# Patient Record
Sex: MALE | Race: WHITE | NOT HISPANIC OR LATINO | Employment: OTHER | URBAN - METROPOLITAN AREA
[De-identification: names, ages, dates, MRNs, and addresses within clinical notes are randomized per-mention and may not be internally consistent; named-entity substitution may affect disease eponyms.]

---

## 2023-09-07 ENCOUNTER — OFFICE VISIT (OUTPATIENT)
Dept: CARDIOLOGY | Facility: CLINIC | Age: 68
End: 2023-09-07
Payer: COMMERCIAL

## 2023-09-07 VITALS
SYSTOLIC BLOOD PRESSURE: 130 MMHG | HEART RATE: 69 BPM | OXYGEN SATURATION: 98 % | DIASTOLIC BLOOD PRESSURE: 60 MMHG | HEIGHT: 68 IN | BODY MASS INDEX: 30.16 KG/M2 | WEIGHT: 199 LBS

## 2023-09-07 DIAGNOSIS — I10 HYPERTENSION, ESSENTIAL: ICD-10-CM

## 2023-09-07 DIAGNOSIS — E78.2 MIXED HYPERLIPIDEMIA: ICD-10-CM

## 2023-09-07 DIAGNOSIS — E11.65 TYPE 2 DIABETES MELLITUS WITH HYPERGLYCEMIA, WITHOUT LONG-TERM CURRENT USE OF INSULIN: Primary | ICD-10-CM

## 2023-09-07 PROBLEM — I25.118 CORONARY ARTERY DISEASE OF NATIVE ARTERY OF NATIVE HEART WITH STABLE ANGINA PECTORIS: Status: ACTIVE | Noted: 2023-09-07

## 2023-09-07 PROCEDURE — 93000 ELECTROCARDIOGRAM COMPLETE: CPT | Performed by: INTERNAL MEDICINE

## 2023-09-07 PROCEDURE — 99204 OFFICE O/P NEW MOD 45 MIN: CPT | Performed by: INTERNAL MEDICINE

## 2023-09-07 RX ORDER — OMEPRAZOLE 40 MG/1
CAPSULE, DELAYED RELEASE ORAL DAILY
COMMUNITY

## 2023-09-07 RX ORDER — BLOOD SUGAR DIAGNOSTIC
STRIP MISCELLANEOUS AS NEEDED
COMMUNITY
Start: 2023-08-23

## 2023-09-07 RX ORDER — LISINOPRIL 10 MG/1
1 TABLET ORAL DAILY
COMMUNITY

## 2023-09-07 RX ORDER — ASPIRIN 81 MG/1
TABLET, CHEWABLE ORAL DAILY
COMMUNITY

## 2023-09-07 RX ORDER — LEVOTHYROXINE SODIUM 0.12 MG/1
TABLET ORAL DAILY
COMMUNITY

## 2023-09-07 RX ORDER — TESTOSTERONE CYPIONATE 200 MG/ML
VIAL (ML) INTRAMUSCULAR WEEKLY
COMMUNITY

## 2023-09-07 RX ORDER — TRAZODONE HYDROCHLORIDE 150 MG/1
TABLET ORAL NIGHTLY
COMMUNITY
Start: 2023-09-05

## 2023-09-07 RX ORDER — CLOPIDOGREL BISULFATE 75 MG/1
TABLET ORAL DAILY
COMMUNITY
End: 2023-09-07

## 2023-09-07 RX ORDER — METOPROLOL SUCCINATE 25 MG/1
1 TABLET, EXTENDED RELEASE ORAL DAILY
COMMUNITY
End: 2023-09-07

## 2023-09-07 RX ORDER — TADALAFIL 5 MG/1
TABLET ORAL AS NEEDED
COMMUNITY

## 2023-09-07 RX ORDER — EMPAGLIFLOZIN 25 MG/1
1 TABLET, FILM COATED ORAL DAILY
COMMUNITY

## 2023-09-07 RX ORDER — ATORVASTATIN CALCIUM 80 MG/1
TABLET, FILM COATED ORAL DAILY
COMMUNITY

## 2023-09-07 RX ORDER — INSULIN HUMAN 500 [IU]/ML
INJECTION, SOLUTION SUBCUTANEOUS 2 TIMES WEEKLY
COMMUNITY

## 2023-09-07 NOTE — PROGRESS NOTES
OFFICE VISIT  NOTE  [facility] CARDIOLOGY      Name: Martin Benson    Date: 2023  MRN:  [id number]  :  1955      REFERRING/PRIMARY PROVIDER:  Provider, No Known    Chief Complaint   Patient presents with    Establish Care    HX of CAD       HPI: Martin Benson is a 67 y.o. male who presents today for CAD.  History of 3V CABG in  in [place], LIMA-LAD, SVG-OM1, and SVG-RPDA.  History of diabetes mellitus type 2, hypertension, hyperlipidemia.  He recently moved to [place] to be closer to family his sister is Niharika Mcdonald.  He reports not taking all of his medicines as prescribed the last 3 months due to moving and he has felt better than he has in years.  A1c uncontrolled at 10.1, cholesterol up to 200.  Exercises and when he is physically active denies chest pain.  Previous chest pain was elephant on his chest and shortness of breath.    Past Medical History:   Diagnosis Date    Acid reflux     Chicken pox     Heart attack     Hepatitis A     Hypertension     Measles     Mumps     Type 2 diabetes mellitus        Past Surgical History:   Procedure Laterality Date    ARTERIAL BYPASS SURGERY      BACK SURGERY      CAROTID STENT      ROTATOR CUFF REPAIR         Social History     Socioeconomic History    Marital status:    Tobacco Use    Smoking status: Former     Types: Cigarettes     Quit date:      Years since quittin.6    Smokeless tobacco: Never   Vaping Use    Vaping Use: Never used   Substance and Sexual Activity    Alcohol use: Yes     Comment: socially maybe 4 drinks a month    Drug use: Never    Sexual activity: Defer       Family History   Problem Relation Age of Onset    No Known Problems Mother     Cancer Father     Diabetes Father     Other Sister         Heart problems        ROS:   Constitutional no fever,  no weight loss   Skin no rash, no subcutaneous nodules   Otolaryngeal no difficulty swallowing   Cardiovascular See HPI   Pulmonary no cough, no sputum  "production   Gastrointestinal no constipation, no diarrhea   Genitourinary no dysuria, no hematuria   Hematologic no easy bruisability, no abnormal bleeding   Musculoskeletal no muscle pain   Neurologic no dizziness, no falls         Allergies   Allergen Reactions    Pregabalin Swelling         Current Outpatient Medications:     aspirin 81 MG chewable tablet, Daily., Disp: , Rfl:     atorvastatin (LIPITOR) 80 MG tablet, Daily., Disp: , Rfl:     clopidogrel (PLAVIX) 75 MG tablet, Daily., Disp: , Rfl:     Continuous Blood Gluc Sensor (FreeStyle Kian 2 Sensor) misc, Daily., Disp: , Rfl:     HumuLIN R U-500 KwikPen 500 UNIT/ML solution pen-injector CONCENTRATED injection, 2 (Two) Times a Week., Disp: , Rfl:     Jardiance 25 MG tablet tablet, Take 1 tablet by mouth Daily., Disp: , Rfl:     levothyroxine (SYNTHROID, LEVOTHROID) 125 MCG tablet, Daily., Disp: , Rfl:     lisinopril (PRINIVIL,ZESTRIL) 10 MG tablet, Take 1 tablet by mouth Daily., Disp: , Rfl:     metoprolol succinate XL (TOPROL-XL) 25 MG 24 hr tablet, Take 1 tablet by mouth Daily., Disp: , Rfl:     omeprazole (priLOSEC) 40 MG capsule, Daily., Disp: , Rfl:     OneTouch Verio test strip, As Needed., Disp: , Rfl:     tadalafil (CIALIS) 5 MG tablet, As Needed., Disp: , Rfl:     Testosterone Cypionate 200 MG/ML solution, 1 (One) Time Per Week., Disp: , Rfl:     traZODone (DESYREL) 150 MG tablet, Every Night. Starting tonight 09/07/2023, Disp: , Rfl:     Vitals:    09/07/23 1053   BP: 130/60   BP Location: Right arm   Patient Position: Sitting   Cuff Size: Adult   Pulse: 69   SpO2: 98%   Weight: 90.3 kg (199 lb)   Height: 172.7 cm (68\")     Body mass index is 30.26 kg/m².    PHYSICAL EXAM:    General Appearance:   well developed  well nourished  HENT:   oropharynx moist  lips not cyanotic  Neck:  thyroid not enlarged  supple  Respiratory:  no respiratory distress  normal breath sounds  no rales  Cardiovascular:  no jugular venous distention  regular rhythm  apical " impulse normal  S1 normal, S2 normal  no S3, no S4   no murmur  no rub, no thrill  carotid pulses normal; no bruit  lower extremity edema: none      Musculoskeletal:  no clubbing of fingers.   normocephalic, head atraumatic  Skin:   warm, dry  Psychiatric:  judgement and insight appropriate  normal mood and affect    RESULTS:     ECG 12 Lead    Date/Time: 9/7/2023 11:15 AM  Performed by: John Sam MD  Authorized by: John Sam MD   Comparison: not compared with previous ECG   Previous ECG: no previous ECG available  Rhythm: sinus rhythm  Rate: normal  BPM: 69  QRS axis: normal    Clinical impression: abnormal EKG  Comments: Lateral T wave inversions noted.  Consistent with previous              Labs:  No results found for: CHOL, TRIG, HDL, LDL, AST, ALT  No results found for: HGBA1C  No components found for: CREATINININE  No results found for: EGFRIFNONA    Most recent PCP note, imaging tests, and labs reviewed.    ASSESSMENT:  Problem List Items Addressed This Visit    None  Visit Diagnoses       Type 2 diabetes mellitus with hyperglycemia, without long-term current use of insulin    -  Primary    Relevant Medications    Jardiance 25 MG tablet tablet    HumuLIN R U-500 KwikPen 500 UNIT/ML solution pen-injector CONCENTRATED injection    Hypertension, essential        Relevant Medications    lisinopril (PRINIVIL,ZESTRIL) 10 MG tablet    metoprolol succinate XL (TOPROL-XL) 25 MG 24 hr tablet    Mixed hyperlipidemia        Relevant Medications    atorvastatin (LIPITOR) 80 MG tablet            PLAN:    1.  CAD:  Continue aspirin, okay to discontinue Plavix  Continue atorvastatin  Continue Jardiance  Increase aerobic exercise    2.  Primary hypertension:  Well-controlled on current regimen okay to discontinue metoprolol due to fatigue and has been over 2 years since CABG  Continue lisinopril    3.  Mixed hyperlipidemia:  Discussed plan tropic benefits of statins, he was not taking 1 because he felt his  cholesterol numbers were good even prior to CAD  Refilled atorvastatin 80 mg daily, advised him to take daily.    4.  Diabetes mellitus type 2:  Uncontrolled A1c, has upcoming appointment with [facility] endocrinology, continue Jardiance, consider GLP-1 agonist  Discussed importance of diabetes control for prevention of further atherosclerotic heart disease      Advance Care Planning   ACP discussion was held with the patient during this visit. Patient has an advance directive (not in EMR), copy requested.         Return to clinic in 9 months, or sooner as needed.    Thank you for the opportunity to share in the care of your patient; please do not hesitate to call me with any questions.     John Sam MD, [facility], [facility] [place] [facility]  Office: [phone] [address] [place] [address]    09/07/23

## 2024-03-19 ENCOUNTER — TELEPHONE (OUTPATIENT)
Dept: CARDIOLOGY | Facility: CLINIC | Age: 69
End: 2024-03-19
Payer: COMMERCIAL

## 2024-03-19 NOTE — TELEPHONE ENCOUNTER
Renetta at [facility] request cardiac risk assessment for umbilical hernia repair with mesh.     Last seen 9/7/23- CAD 3V CABG in 2021 in [place], LIMA-LAD, SVG-OM1, and SVG-RPDA, currently taking Aspirin 81 mg.     If okay to proceed will fax CC letter to [phone]

## 2024-06-05 ENCOUNTER — TELEPHONE (OUTPATIENT)
Dept: CARDIOLOGY | Facility: HOSPITAL | Age: 69
End: 2024-06-05
Payer: COMMERCIAL

## 2024-06-05 NOTE — TELEPHONE ENCOUNTER
Spoke with patient and he stated he had labwork done through [facility] with his endocrinologist at [place]. He stated he will bring lab results with him  at his f/u appointment.

## 2024-06-13 ENCOUNTER — OFFICE VISIT (OUTPATIENT)
Dept: CARDIOLOGY | Facility: CLINIC | Age: 69
End: 2024-06-13
Payer: MEDICARE

## 2024-06-13 VITALS
BODY MASS INDEX: 29.46 KG/M2 | OXYGEN SATURATION: 94 % | SYSTOLIC BLOOD PRESSURE: 98 MMHG | DIASTOLIC BLOOD PRESSURE: 74 MMHG | HEIGHT: 68 IN | HEART RATE: 102 BPM | WEIGHT: 194.4 LBS

## 2024-06-13 DIAGNOSIS — Z76.89 ENCOUNTER TO ESTABLISH CARE: ICD-10-CM

## 2024-06-13 DIAGNOSIS — E78.2 MIXED HYPERLIPIDEMIA: ICD-10-CM

## 2024-06-13 DIAGNOSIS — I25.118 CORONARY ARTERY DISEASE OF NATIVE ARTERY OF NATIVE HEART WITH STABLE ANGINA PECTORIS: Primary | ICD-10-CM

## 2024-06-13 DIAGNOSIS — I10 HYPERTENSION, ESSENTIAL: ICD-10-CM

## 2024-06-13 DIAGNOSIS — R35.0 BENIGN PROSTATIC HYPERPLASIA WITH URINARY FREQUENCY: Primary | ICD-10-CM

## 2024-06-13 DIAGNOSIS — R35.0 BENIGN PROSTATIC HYPERPLASIA WITH URINARY FREQUENCY: ICD-10-CM

## 2024-06-13 DIAGNOSIS — E11.69 TYPE 2 DIABETES MELLITUS WITH OTHER SPECIFIED COMPLICATION, WITH LONG-TERM CURRENT USE OF INSULIN: ICD-10-CM

## 2024-06-13 DIAGNOSIS — R07.2 PRECORDIAL PAIN: ICD-10-CM

## 2024-06-13 DIAGNOSIS — N40.1 BENIGN PROSTATIC HYPERPLASIA WITH URINARY FREQUENCY: ICD-10-CM

## 2024-06-13 DIAGNOSIS — Z79.4 TYPE 2 DIABETES MELLITUS WITH OTHER SPECIFIED COMPLICATION, WITH LONG-TERM CURRENT USE OF INSULIN: ICD-10-CM

## 2024-06-13 DIAGNOSIS — N40.1 BENIGN PROSTATIC HYPERPLASIA WITH URINARY FREQUENCY: Primary | ICD-10-CM

## 2024-06-13 RX ORDER — TRIAMCINOLONE ACETONIDE 1 MG/G
CREAM TOPICAL 2 TIMES DAILY
COMMUNITY
Start: 2024-03-06

## 2024-06-13 RX ORDER — NYSTATIN 100000 U/G
CREAM TOPICAL AS NEEDED
COMMUNITY
Start: 2024-03-06

## 2024-06-13 NOTE — PROGRESS NOTES
OFFICE VISIT  NOTE  [facility] CARDIOLOGY      Name: Demarcus Benson    Date: 2024  MRN:  [id number]  :  1955      REFERRING/PRIMARY PROVIDER:  Provider, No Known    Chief Complaint   Patient presents with    Coronary Artery Disease    Hypertension    Hyperlipidemia    Shortness of Breath    Fatigue       HPI: Demarcus Benson is a 68 y.o. male who presents for CAD.  History of 3V CABG in  in [place], LIMA-LAD, SVG-OM1, and SVG-RPDA.  History of diabetes mellitus type 2, hypertension, hyperlipidemia.  He recently moved to [place] to be closer to family his sister is Niharika Mcdonald.  He reports not taking all of his medicines as prescribed the last 3 months due to moving and he has felt better than he has in years.  A1c uncontrolled at 10.1, cholesterol up to 200.  Exercises and when he is physically active denies chest pain.  Previous chest pain was elephant on his chest and shortness of breath.  Had COVID in January now has worsening shortness of breath and some chest discomfort Jenny when he is exerting himself.    Past Medical History:   Diagnosis Date    Acid reflux     Chicken pox     Heart attack     Hepatitis A     Hypertension     Measles     Mumps     Type 2 diabetes mellitus        Past Surgical History:   Procedure Laterality Date    BACK SURGERY      CAROTID STENT      OTHER SURGICAL HISTORY      Tripple Bypass    ROTATOR CUFF REPAIR         Social History     Socioeconomic History    Marital status:    Tobacco Use    Smoking status: Former     Current packs/day: 0.00     Types: Cigarettes     Quit date:      Years since quittin.4     Passive exposure: Past    Smokeless tobacco: Never   Vaping Use    Vaping status: Never Used   Substance and Sexual Activity    Alcohol use: Yes     Comment: socially maybe 4 drinks a month    Drug use: Never    Sexual activity: Defer       Family History   Problem Relation Age of Onset    No Known Problems Mother      "Cancer Father     Diabetes Father     Other Sister         Heart problems        ROS:   Constitutional no fever,  no weight loss   Skin no rash, no subcutaneous nodules   Otolaryngeal no difficulty swallowing   Cardiovascular See HPI   Pulmonary no cough, no sputum production   Gastrointestinal no constipation, no diarrhea   Genitourinary no dysuria, no hematuria   Hematologic no easy bruisability, no abnormal bleeding   Musculoskeletal no muscle pain   Neurologic no dizziness, no falls         Allergies   Allergen Reactions    Pregabalin Swelling         Current Outpatient Medications:     aspirin 81 MG chewable tablet, Daily., Disp: , Rfl:     atorvastatin (LIPITOR) 80 MG tablet, Daily., Disp: , Rfl:     Continuous Blood Gluc Sensor (FreeStyle Kian 2 Sensor) misc, Daily., Disp: , Rfl:     HumuLIN R U-500 KwikPen 500 UNIT/ML solution pen-injector CONCENTRATED injection, 2 (Two) Times a Day. Sliding scale, Disp: , Rfl:     Jardiance 25 MG tablet tablet, Take 1 tablet by mouth Daily., Disp: , Rfl:     levothyroxine (SYNTHROID, LEVOTHROID) 125 MCG tablet, Daily., Disp: , Rfl:     lisinopril (PRINIVIL,ZESTRIL) 10 MG tablet, Take 1 tablet by mouth Daily., Disp: , Rfl:     nystatin (MYCOSTATIN) 008370 UNIT/GM cream, Apply  topically to the appropriate area as directed As Needed., Disp: , Rfl:     omeprazole (priLOSEC) 40 MG capsule, Daily., Disp: , Rfl:     OneTouch Verio test strip, As Needed., Disp: , Rfl:     tadalafil (CIALIS) 5 MG tablet, As Needed., Disp: , Rfl:     triamcinolone (KENALOG) 0.1 % cream, Apply  topically to the appropriate area as directed 2 (Two) Times a Day., Disp: , Rfl:     Vitals:    06/13/24 1327   BP: 98/74   BP Location: Left arm   Patient Position: Sitting   Pulse: 102   SpO2: 94%   Weight: 88.2 kg (194 lb 6.4 oz)   Height: 172.7 cm (68\")       Body mass index is 29.56 kg/m².    PHYSICAL EXAM:    General Appearance:   well developed  well nourished  HENT:   oropharynx moist  lips not " "cyanotic  Neck:  thyroid not enlarged  supple  Respiratory:  no respiratory distress  normal breath sounds  no rales  Cardiovascular:  no jugular venous distention  regular rhythm  apical impulse normal  S1 normal, S2 normal  no S3, no S4   no murmur  no rub, no thrill  carotid pulses normal; no bruit  lower extremity edema: none      Musculoskeletal:  no clubbing of fingers.   normocephalic, head atraumatic  Skin:   warm, dry  Psychiatric:  judgement and insight appropriate  normal mood and affect    RESULTS:   Procedures          Labs:  No results found for: \"CHOL\", \"TRIG\", \"HDL\", \"LDL\", \"AST\", \"ALT\"  No results found for: \"HGBA1C\"  No components found for: \"CREATINININE\"  No results found for: \"EGFRIFNONA\"    Most recent PCP note, imaging tests, and labs reviewed.    ASSESSMENT:  Problem List Items Addressed This Visit       Coronary artery disease of native artery of native heart with stable angina pectoris - Primary    Overview     3V CABG 11/19/2021 (LIMA-LAD, SVG-OM1, SVG-RPDA in [place].  Previous history of stent to mid LAD in 2004 and 2012.         Relevant Orders    Adult Transthoracic Echo Complete w/ Color, Spectral and Contrast if necessary per protocol    Stress Test With Myocardial Perfusion One Day    Hypertension, essential    Mixed hyperlipidemia    Type 2 diabetes mellitus    Benign prostatic hyperplasia with urinary frequency     Other Visit Diagnoses       Precordial pain        Relevant Orders    Adult Transthoracic Echo Complete w/ Color, Spectral and Contrast if necessary per protocol    Stress Test With Myocardial Perfusion One Day            PLAN:    1.  CAD:  Continue aspirin, stopped Plavix 9/2023  Continue atorvastatin  Continue Jardiance    Worsening chest pain shortness of breath recently, stress test and echocardiogram ordered for further assessment.    2.  Primary hypertension:  Stop metoprolol due to fatigue 9/2023  Continue lisinopril    3.  Mixed hyperlipidemia:  Refilled " atorvastatin 80 mg daily, advised him to take daily.  Lipids 5/2024 TC: 174, LDL 98, HDL 36    Decrease saturated fat in his diet will repeat in 6 months    4.  Diabetes mellitus type 2:  Uncontrolled A1c continue Jardiance, consider GLP-1 agonist  Discussed importance of diabetes control for prevention of further atherosclerotic heart disease  Refer to endocrinology    Needs primary care physician    5.  BPH, requesting urology consultation    Advance Care Planning   ACP discussion was held with the patient during this visit. Patient has an advance directive (not in EMR), copy requested.         Return to clinic in 12 months, or sooner as needed.    Thank you for the opportunity to share in the care of your patient; please do not hesitate to call me with any questions.     John Sam MD, [facility], [facility]  Office: [phone] [address] [place] [address]    06/13/24

## 2024-07-02 ENCOUNTER — OFFICE VISIT (OUTPATIENT)
Dept: UROLOGY | Facility: CLINIC | Age: 69
End: 2024-07-02
Payer: MEDICARE

## 2024-07-02 DIAGNOSIS — N40.1 BENIGN PROSTATIC HYPERPLASIA WITH URINARY FREQUENCY: Primary | ICD-10-CM

## 2024-07-02 DIAGNOSIS — N32.81 OAB (OVERACTIVE BLADDER): ICD-10-CM

## 2024-07-02 DIAGNOSIS — R35.0 BENIGN PROSTATIC HYPERPLASIA WITH URINARY FREQUENCY: Primary | ICD-10-CM

## 2024-07-02 DIAGNOSIS — R39.9 LOWER URINARY TRACT SYMPTOMS (LUTS): ICD-10-CM

## 2024-07-02 NOTE — PROGRESS NOTES
LUTS Male Office Visit      Patient Name: Demarcus Benson  : 1955   MRN: [id number]     Chief Complaint: Lower Urinary Tract Symptoms    Referring Provider: John Sam MD    History of Present Illness: Mr. Benson is a 68 y.o. male with history of lower urinary tract symptoms.  Past medical history significant for CAD, hypertension, hyperlipidemia, type 2 diabetes.  Patient presents today for evaluation of lower urinary tract symptoms.  He reports bother associate with storage and voiding symptoms.  Reports primary bother symptoms include urinary frequency, urgency.  Patient denies history of hematuria.  Denies history of recurrent urinary tract infection.  Denies past instrumentation or procedure.  Currently taking 5 mg tadalafil.  Reports progression, worsening of symptoms over the past 1 to 2 years.  IPSS 23.          Subjective      Review of System: Review of Systems   Genitourinary:  Positive for frequency and urgency. Negative for decreased urine volume, difficulty urinating, dysuria, enuresis, flank pain and hematuria.        I have reviewed the ROS documented by my clinical staff, updated appropriate and I agree. Bunny Pritchett MD    Past Medical History:  Past Medical History:   Diagnosis Date    Acid reflux     Chicken pox     Clotting disorder 2004    started when prescribed aspirin and plavix    Coronary artery disease 2004    Heart attack     Hepatitis A     Hypertension     Measles     Mumps     Type 2 diabetes mellitus        Past Surgical History:  Past Surgical History:   Procedure Laterality Date    BACK SURGERY      CAROTID STENT      HERNIA REPAIR  3/2024    OTHER SURGICAL HISTORY      Tripple Bypass    ROTATOR CUFF REPAIR      VASECTOMY         Medications:    Current Outpatient Medications:     aspirin 81 MG chewable tablet, Daily., Disp: , Rfl:     atorvastatin (LIPITOR) 80 MG tablet, Daily., Disp: , Rfl:     Continuous Blood Gluc Sensor (FreeStyle Kian 2  Sensor) misc, Daily., Disp: , Rfl:     HumuLIN R U-500 KwikPen 500 UNIT/ML solution pen-injector CONCENTRATED injection, 2 (Two) Times a Day. Sliding scale, Disp: , Rfl:     Jardiance 25 MG tablet tablet, Take 1 tablet by mouth Daily., Disp: , Rfl:     levothyroxine (SYNTHROID, LEVOTHROID) 125 MCG tablet, Daily., Disp: , Rfl:     lisinopril (PRINIVIL,ZESTRIL) 10 MG tablet, Take 1 tablet by mouth Daily., Disp: , Rfl:     metFORMIN (GLUCOPHAGE) 1000 MG tablet, Take 1 tablet by mouth 2 (Two) Times a Day., Disp: , Rfl:     nystatin (MYCOSTATIN) [id number] UNIT/GM cream, Apply  topically to the appropriate area as directed As Needed., Disp: , Rfl:     omeprazole (priLOSEC) 40 MG capsule, Daily., Disp: , Rfl:     OneTouch Verio test strip, As Needed., Disp: , Rfl:     tadalafil (CIALIS) 5 MG tablet, As Needed., Disp: , Rfl:     triamcinolone (KENALOG) 0.1 % cream, Apply  topically to the appropriate area as directed 2 (Two) Times a Day., Disp: , Rfl:     Mirabegron ER (Myrbetriq) 25 MG tablet sustained-release 24 hour 24 hr tablet, Take 1 tablet by mouth Daily., Disp: 30 tablet, Rfl: 0    Allergies:  Allergies   Allergen Reactions    Pregabalin Swelling       Social History:  Social History     Socioeconomic History    Marital status:    Tobacco Use    Smoking status: Former     Current packs/day: 0.00     Average packs/day: 0.8 packs/day for 28.3 years (21.2 ttl pk-yrs)     Types: Cigarettes     Start date: 1975     Quit date: 2004     Years since quittin.2     Passive exposure: Past    Smokeless tobacco: Never    Tobacco comments:     A few prior brief periods of stopping   Vaping Use    Vaping status: Never Used   Substance and Sexual Activity    Alcohol use: Yes     Alcohol/week: 3.0 standard drinks of alcohol     Types: 1 Glasses of wine, 1 Cans of beer, 1 Shots of liquor per week     Comment: Social drinker only,  4 drinks a month is more accurate    Drug use: Never    Sexual activity: Not Currently      Partners: Female     Birth control/protection: Vasectomy       Family History:  Family History   Problem Relation Age of Onset    No Known Problems Mother     Cancer Father         colon cancer    Diabetes Father     Other Sister         Heart problems       IPSS Questionnaire (AUA-7):  Over the past month…    1)  Incomplete Emptying  How often have you had a sensation of not emptying your bladder?  4 - More than half the time   2)  Frequency  How often have you had to urinate less than every two hours? 5 - Almost always   3)  Intermittency  How often have you found you stopped and started again several times when you urinated?  3 - About half the time   4) Urgency  How often have you found it difficult to postpone urination?  5 - Almost always   5) Weak Stream  How often have you had a weak urinary stream?  3 - About half the time   6) Straining  How often have you had to push or strain to begin urination?  0 - Not at all   7) Nocturia  How many times did you typically get up at night to urinate?  3 - 3 times   Total Score:  23       Quality of life due to urinary symptoms:  If you were to spend the rest of your life with your urinary condition the way it is now, how would you feel about that? 6-Terrible   Urine Leakage (Incontinence) 1-Mild (A few drops a day, no pad use)           Objective     Physical Exam:     Vital Signs: There were no vitals filed for this visit.  There is no height or weight on file to calculate BMI.     Physical Exam  Vitals and nursing note reviewed.   Constitutional:       Appearance: Normal appearance.   HENT:      Head: Normocephalic and atraumatic.   Cardiovascular:      Comments: Well perfused  Pulmonary:      Effort: Pulmonary effort is normal.   Abdominal:      General: Abdomen is flat.      Palpations: Abdomen is soft.   Musculoskeletal:         General: Normal range of motion.   Skin:     General: Skin is warm and dry.   Neurological:      General: No focal deficit present.  "     Mental Status: He is alert and oriented to person, place, and time. Mental status is at baseline.   Psychiatric:         Mood and Affect: Mood normal.         Behavior: Behavior normal.         Thought Content: Thought content normal.         Judgment: Judgment normal.             Labs:   No results found for: \"PSA\"    Brief Urine Lab Results       None                 No results found for: \"GLUCOSE\", \"CALCIUM\", \"NA\", \"K\", \"CO2\", \"CL\", \"BUN\", \"CREATININE\", \"EGFRIFAFRI\", \"EGFRIFNONA\", \"BCR\", \"ANIONGAP\"    No results found for: \"WBC\", \"HGB\", \"HCT\", \"MCV\", \"PLT\"    Images:   No Images in the past 120 days found..    Measures:   Tobacco:   Demarcus Benson  reports that he quit smoking about 20 years ago. His smoking use included cigarettes. He started smoking about 48 years ago. He has a 21.2 pack-year smoking history. He has been exposed to tobacco smoke. He has never used smokeless tobacco. I have educated him on the risk of diseases from using tobacco products.     Assessment / Plan      Assessment:  Mr. Benson is a 68 y.o. male who presents with lower urinary tract symptoms. He reports bother associated with both storage and voiding urinary symptoms.. IPSS today is 23.     Diagnoses and all orders for this visit:    1. Benign prostatic hyperplasia with urinary frequency (Primary)    2. Lower urinary tract symptoms (LUTS)    3. OAB (overactive bladder)  -     Mirabegron ER (Myrbetriq) 25 MG tablet sustained-release 24 hour 24 hr tablet; Take 1 tablet by mouth Daily.  Dispense: 30 tablet; Refill: 0           Patient Education:     Today we discussed the etiology and management of lower urinary tract symptoms.  We discussed work-up including history and physical exam, symptom questionnaire, PVR. We discussed benign growth of the prostate as men age.  We discussed this occurs mostly in the transition zone of the prostate.  We discussed there is both an increase the in the amount of prostatic stroma as well as " the number of alpha-1 receptors in the prostate.  We discussed that as the prostate grows there can be increased bladder outlet resistance.  We discussed this results in increased smooth muscle muscle tone which can cause long-term changes to the bladder.  We discussed the presentation and symptoms including decreased force of stream, hesitancy, intermittent stream, incomplete emptying, frequency, urgency, incontinence, nocturia. We discussed that his lower urinary tract symptoms are both storage and voiding symptoms.  We discussed that if the symptoms are prolonged the bladder may be decompensated resulting in absent or ineffective contractions.  We discussed that the severity of symptoms do not always correlate with the exact prostate size.  Discussed that the severity of urinary symptoms do not correlate with the degree of bladder outlet obstruction.  We discussed the indication for operative intervention includes hematuria, recurrent urinary tract infection, damage to the kidney and change in renal function, acute urinary retention.  We discussed that the patient does not have any of these for indications symptom bother may direct desire for operative intervention.    We discussed management strategies including conservative measures to improve symptoms.  We discussed the importance of decreasing caffeinated and irritative beverages, increasing fluid hydration.  We discussed the role that obstipation can play in urinary symptoms and the importance of a bowel regimen.  We discussed medical management and medical options to improve urinary symptoms.  We discussed alpha-blocker, risks and benefits of this medication.  We discussed 5 alpha reductase inhibitor, risks and benefits of this medication.     We discussed cystoscopy, transrectal ultrasound of the prostate for volume.  We discussed that cystoscopy will allow evaluation of the urethra, prostatic architecture/anatomy, health of the bladder.  We discussed  transrectal ultrasound of the prostate for volume will allow us to measure the exact size of the prostate.  We discussed the importance of these work-ups in identifying and tailoring a specific treatment strategy to the patient.     We discussed that based upon the work-up described above we will further decide an appropriate treatment plan.  We discussed that these procedures will help us identify whether p.o. medical management versus procedure for bladder outlet obstruction is warranted.    Patient continues to report primary bother with storage based urinary symptoms.  He is understanding of indication for workup, elects to decline today.  He would like to trial OAB medication, beta 3 agonist.  Discussed risk and benefits of medication.  We will start beta 3 agonist today.  He will follow-up in 4 to 6 weeks for symptom check    Follow Up:   Return in about 6 weeks (around 8/13/2024) for Recheck.    I spent approximately 45 minutes providing clinical care for this patient; including review of patient's chart and provider documentation, face to face time spent with patient in examination room (obtaining history, performing physical exam, discussing diagnosis and management options), placing orders, and completing patient documentation.     Bunny Pritchett MD  [facility]

## 2024-07-04 RX ORDER — MIRABEGRON 25 MG/1
25 TABLET, FILM COATED, EXTENDED RELEASE ORAL DAILY
Qty: 30 TABLET | Refills: 0 | COMMUNITY
Start: 2024-07-04

## 2024-07-17 ENCOUNTER — HOSPITAL ENCOUNTER (OUTPATIENT)
Dept: CARDIOLOGY | Facility: HOSPITAL | Age: 69
Discharge: HOME | End: 2024-07-17
Payer: MEDICARE

## 2024-07-17 VITALS
DIASTOLIC BLOOD PRESSURE: 84 MMHG | OXYGEN SATURATION: 96 % | WEIGHT: 194 LBS | HEIGHT: 68 IN | SYSTOLIC BLOOD PRESSURE: 150 MMHG | BODY MASS INDEX: 29.4 KG/M2 | HEART RATE: 76 BPM

## 2024-07-17 DIAGNOSIS — R07.2 PRECORDIAL PAIN: ICD-10-CM

## 2024-07-17 DIAGNOSIS — I25.118 CORONARY ARTERY DISEASE OF NATIVE ARTERY OF NATIVE HEART WITH STABLE ANGINA PECTORIS: ICD-10-CM

## 2024-07-17 LAB
BH CV ECHO MEAS - AO MAX PG: 5.1 MMHG
BH CV ECHO MEAS - AO ROOT DIAM: 2.8 CM
BH CV ECHO MEAS - AO V2 MAX: 113.2 CM/SEC
BH CV ECHO MEAS - ESV(CUBED): 12.5 ML
BH CV ECHO MEAS - IVSD: 1.21 CM
BH CV ECHO MEAS - LA DIMENSION: 2.3 CM
BH CV ECHO MEAS - LV MAX PG: 5.2 MMHG
BH CV ECHO MEAS - LV MEAN PG: 2.1 MMHG
BH CV ECHO MEAS - LV V1 MAX: 114.1 CM/SEC
BH CV ECHO MEAS - LV V1 VTI: 19.5 CM
BH CV ECHO MEAS - LVIDD: 3.6 CM
BH CV ECHO MEAS - LVIDS: 2.32 CM
BH CV ECHO MEAS - LVOT DIAM: 2 CM
BH CV ECHO MEAS - LVPWD: 1.2 CM
BH CV ECHO MEAS - MV A MAX VEL: 62.4 CM/SEC
BH CV ECHO MEAS - MV DEC SLOPE: 281.2 CM/SEC2
BH CV ECHO MEAS - MV DEC TIME: 426 SEC
BH CV ECHO MEAS - MV MAX PG: 3.7 MMHG
BH CV ECHO MEAS - MV MEAN PG: 1.2 MMHG
BH CV ECHO MEAS - MV P1/2T: 74 MSEC
BH CV ECHO MEAS - MV V2 VTI: 18.2 CM
BH CV ECHO MEAS - PA ACC TIME: 0.07 SEC
BH CV ECHO MEAS - TAPSE (>1.6): 2.03 CM
BH CV REST NUCLEAR ISOTOPE DOSE: 9.9 MCI
BH CV STRESS BP STAGE 2: NORMAL
BH CV STRESS BP STAGE 4: NORMAL
BH CV STRESS COMMENTS STAGE 1: NORMAL
BH CV STRESS DOSE REGADENOSON STAGE 1: 0.4
BH CV STRESS DURATION MIN STAGE 1: 1
BH CV STRESS DURATION MIN STAGE 2: 1
BH CV STRESS DURATION MIN STAGE 3: 1
BH CV STRESS DURATION MIN STAGE 4: 1
BH CV STRESS DURATION SEC STAGE 1: 10
BH CV STRESS DURATION SEC STAGE 2: 0
BH CV STRESS HR STAGE 1: 75
BH CV STRESS HR STAGE 2: 95
BH CV STRESS HR STAGE 3: 90
BH CV STRESS HR STAGE 4: 88
BH CV STRESS NUCLEAR ISOTOPE DOSE: 33 MCI
BH CV STRESS O2 STAGE 1: 94
BH CV STRESS O2 STAGE 2: 95
BH CV STRESS O2 STAGE 3: 99
BH CV STRESS O2 STAGE 4: 97
BH CV STRESS PROTOCOL 1: NORMAL
BH CV STRESS RECOVERY BP: NORMAL MMHG
BH CV STRESS RECOVERY HR: 90 BPM
BH CV STRESS RECOVERY O2: 95 %
BH CV STRESS STAGE 1: 1
BH CV STRESS STAGE 2: 2
BH CV STRESS STAGE 3: 3
BH CV STRESS STAGE 4: 4
BH CV XLRA - RV BASE: 4.4 CM
BH CV XLRA - RV LENGTH: 7 CM
BH CV XLRA - RV MID: 3.7 CM
BH CV XLRA - TDI S': 20.8 CM/SEC
LV EF NUC BP: 61 %
MAXIMAL PREDICTED HEART RATE: 154 BPM
MV VALVE AREA BY PLANIMETRY: 3 CM2
PERCENT MAX PREDICTED HR: 61.69 %
STRESS BASELINE BP: NORMAL MMHG
STRESS BASELINE HR: 74 BPM
STRESS O2 SAT REST: 96 %
STRESS PERCENT HR: 73 %
STRESS POST ESTIMATED WORKLOAD: 1 METS
STRESS POST EXERCISE DUR MIN: 4 MIN
STRESS POST EXERCISE DUR SEC: 0 SEC
STRESS POST O2 SAT PEAK: 95 %
STRESS POST PEAK BP: NORMAL MMHG
STRESS POST PEAK HR: 95 BPM
STRESS TARGET HR: 131 BPM

## 2024-07-17 PROCEDURE — 78452 HT MUSCLE IMAGE SPECT MULT: CPT

## 2024-07-17 PROCEDURE — A9500 TC99M SESTAMIBI: HCPCS | Performed by: INTERNAL MEDICINE

## 2024-07-17 PROCEDURE — 93306 TTE W/DOPPLER COMPLETE: CPT

## 2024-07-17 PROCEDURE — 0 TECHNETIUM SESTAMIBI: Performed by: INTERNAL MEDICINE

## 2024-07-17 PROCEDURE — 25010000002 REGADENOSON 0.4 MG/5ML SOLUTION: Performed by: INTERNAL MEDICINE

## 2024-07-17 PROCEDURE — 93017 CV STRESS TEST TRACING ONLY: CPT

## 2024-07-17 RX ORDER — REGADENOSON 0.08 MG/ML
0.4 INJECTION, SOLUTION INTRAVENOUS ONCE
Status: COMPLETED | OUTPATIENT
Start: 2024-07-17 | End: 2024-07-17

## 2024-07-17 RX ADMIN — TECHNETIUM TC 99M SESTAMIBI 1 DOSE: 1 INJECTION INTRAVENOUS at 11:35

## 2024-07-17 RX ADMIN — TECHNETIUM TC 99M SESTAMIBI 1 DOSE: 1 INJECTION INTRAVENOUS at 13:50

## 2024-07-17 RX ADMIN — REGADENOSON 0.4 MG: 0.08 INJECTION, SOLUTION INTRAVENOUS at 13:44

## 2024-07-18 ENCOUNTER — TELEPHONE (OUTPATIENT)
Dept: CARDIOLOGY | Facility: CLINIC | Age: 69
End: 2024-07-18
Payer: MEDICARE

## 2024-07-18 NOTE — TELEPHONE ENCOUNTER
----- Message from John Sam sent at 7/18/2024  8:16 AM EDT -----  Please inform the patient of their test results.  Benign stress test. Thank you.

## 2025-06-13 ENCOUNTER — TELEPHONE (OUTPATIENT)
Dept: CARDIOLOGY | Facility: CLINIC | Age: 70
End: 2025-06-13
Payer: MEDICARE

## 2025-06-19 ENCOUNTER — OFFICE VISIT (OUTPATIENT)
Dept: CARDIOLOGY | Facility: CLINIC | Age: 70
End: 2025-06-19
Payer: MEDICARE

## 2025-06-19 VITALS
BODY MASS INDEX: 29.55 KG/M2 | SYSTOLIC BLOOD PRESSURE: 132 MMHG | HEIGHT: 68 IN | DIASTOLIC BLOOD PRESSURE: 60 MMHG | WEIGHT: 195 LBS | HEART RATE: 75 BPM | OXYGEN SATURATION: 95 %

## 2025-06-19 DIAGNOSIS — E78.2 MIXED HYPERLIPIDEMIA: Chronic | ICD-10-CM

## 2025-06-19 DIAGNOSIS — I73.9 RIGHT LEG CLAUDICATION: ICD-10-CM

## 2025-06-19 DIAGNOSIS — I73.9 RIGHT LEG CLAUDICATION: Primary | ICD-10-CM

## 2025-06-19 DIAGNOSIS — I10 HYPERTENSION, ESSENTIAL: Chronic | ICD-10-CM

## 2025-06-19 DIAGNOSIS — I25.118 CORONARY ARTERY DISEASE OF NATIVE ARTERY OF NATIVE HEART WITH STABLE ANGINA PECTORIS: Primary | Chronic | ICD-10-CM

## 2025-06-19 RX ORDER — GLIMEPIRIDE 4 MG/1
4 TABLET ORAL 2 TIMES DAILY
COMMUNITY

## 2025-06-19 RX ORDER — TIRZEPATIDE 5 MG/.5ML
INJECTION, SOLUTION SUBCUTANEOUS
COMMUNITY

## 2025-06-19 RX ORDER — DEXLANSOPRAZOLE 60 MG/1
60 CAPSULE, DELAYED RELEASE ORAL DAILY
COMMUNITY
Start: 2025-05-08

## 2025-06-19 RX ORDER — TESTOSTERONE CYPIONATE 200 MG/ML
200 INJECTION, SOLUTION INTRAMUSCULAR
COMMUNITY
Start: 2025-05-29

## 2025-06-19 RX ORDER — ATORVASTATIN CALCIUM 80 MG/1
80 TABLET, FILM COATED ORAL DAILY
Qty: 90 TABLET | Refills: 3 | Status: SHIPPED | OUTPATIENT
Start: 2025-06-19

## 2025-06-19 RX ORDER — LISINOPRIL 10 MG/1
10 TABLET ORAL DAILY
Qty: 90 TABLET | Refills: 3 | Status: SHIPPED | OUTPATIENT
Start: 2025-06-19

## 2025-06-19 NOTE — PROGRESS NOTES
OFFICE VISIT  NOTE  [facility] CARDIOLOGY      Name: Demarcus Benson    Date: 2025  MRN:  [id number]  :  1955      REFERRING/PRIMARY PROVIDER:  Brisa Levine APRN    Chief Complaint   Patient presents with    Coronary artery disease of native artery of native heart wi       HPI: Demarcus Benson is a 69 y.o. male who presents for CAD.  History of 3V CABG in  in [place], LIMA-LAD, SVG-OM1, and SVG-RPDA.  History of diabetes mellitus type 2, hypertension, hyperlipidemia.  He recently moved to [place] to be closer to family his sister is Niharika Mcdonald.  He reports not taking all of his medicines as prescribed the last 3 months due to moving and he has felt better than he has in years.  A1c uncontrolled at 10.1, cholesterol up to 200.  Exercises and when he is physically active denies chest pain.  Previous chest pain was elephant on his chest and shortness of breath.  Overall doing well but does have some right lower leg pain on the outside, at rest or exertion he is concerned about vascular problem, it is a dull aching pain.    Past Medical History:   Diagnosis Date    Abnormal ECG 2004    Only during actual heat attack    Acid reflux     Chicken pox     Clotting disorder 2004    started when prescribed aspirin and plavix    Coronary artery disease 2004    Heart attack     Hepatitis A     Hyperlipidemia     slightly elevated    Hypertension     Measles     Mumps     Sleep apnea     moderate    Type 2 diabetes mellitus        Past Surgical History:   Procedure Laterality Date    BACK SURGERY      CARDIAC CATHETERIZATION  , ,     resulted in stent, stent, triple bypass    CAROTID STENT      CORONARY ARTERY BYPASS GRAFT  2021    during  triple bypass surgery    CORONARY STENT PLACEMENT  ,     HERNIA REPAIR  3/2024    OTHER SURGICAL HISTORY      Tripple Bypass    ROTATOR CUFF REPAIR      VASECTOMY         Social History      Socioeconomic History    Marital status:    Tobacco Use    Smoking status: Former     Current packs/day: 0.00     Average packs/day: 0.8 packs/day for 30.1 years (22.6 ttl pk-yrs)     Types: Cigarettes     Start date: 1975     Quit date: 2004     Years since quittin.2     Passive exposure: Past    Smokeless tobacco: Never    Tobacco comments:     A few prior brief periods of stopping   Vaping Use    Vaping status: Never Used   Substance and Sexual Activity    Alcohol use: Yes     Alcohol/week: 3.0 standard drinks of alcohol     Types: 1 Glasses of wine, 1 Cans of beer, 1 Shots of liquor per week     Comment: Social drinker only,  4 drinks a month is more accurate    Drug use: Never    Sexual activity: Not Currently     Partners: Female     Birth control/protection: Vasectomy       Family History   Problem Relation Age of Onset    No Known Problems Mother     Cancer Father         colon cancer    Diabetes Father     Other Sister         Heart problems    Heart attack Sister         current patient    Heart disease Sister         current patient    Heart failure Sister         current patient        ROS:   Constitutional no fever,  no weight loss   Skin no rash, no subcutaneous nodules   Otolaryngeal no difficulty swallowing   Cardiovascular See HPI   Pulmonary no cough, no sputum production   Gastrointestinal no constipation, no diarrhea   Genitourinary no dysuria, no hematuria   Hematologic no easy bruisability, no abnormal bleeding   Musculoskeletal no muscle pain   Neurologic no dizziness, no falls         Allergies   Allergen Reactions    Pregabalin Swelling         Current Outpatient Medications:     aspirin 81 MG chewable tablet, Daily., Disp: , Rfl:     atorvastatin (LIPITOR) 80 MG tablet, Daily., Disp: , Rfl:     Continuous Blood Gluc Sensor (FreeStyle Kian 2 Sensor) misc, Daily., Disp: , Rfl:     dexlansoprazole (DEXILANT) 60 MG capsule, Take 1 capsule by mouth Daily., Disp: , Rfl:  "    glimepiride (AMARYL) 4 MG tablet, Take 1 tablet by mouth 2 (Two) Times a Day., Disp: , Rfl:     Jardiance 25 MG tablet tablet, Take 1 tablet by mouth Daily., Disp: , Rfl:     levothyroxine (SYNTHROID, LEVOTHROID) 125 MCG tablet, Daily., Disp: , Rfl:     lisinopril (PRINIVIL,ZESTRIL) 10 MG tablet, Take 1 tablet by mouth Daily., Disp: , Rfl:     metFORMIN (GLUCOPHAGE) 1000 MG tablet, Take 1 tablet by mouth 2 (Two) Times a Day., Disp: , Rfl:     Mounjaro 5 MG/0.5ML solution auto-injector, INJECT 5MG SUB-Q ONCE WEEKLY ** D/C OZEMPIC, Disp: , Rfl:     OneTouch Verio test strip, As Needed., Disp: , Rfl:     Testosterone Cypionate (DEPOTESTOTERONE CYPIONATE) 200 MG/ML injection, Inject 1 mL into the appropriate muscle as directed by prescriber Every 7 (Seven) Days., Disp: , Rfl:     Vitals:    06/19/25 1322   BP: 132/60   BP Location: Left arm   Patient Position: Sitting   Cuff Size: Adult   Pulse: 75   SpO2: 95%   Weight: 88.5 kg (195 lb)   Height: 172.7 cm (68\")       Body mass index is 29.65 kg/m².    PHYSICAL EXAM:    General Appearance:   · well developed  · well nourished  Neck:  · thyroid not enlarged  · supple  Respiratory:  · no respiratory distress  · normal breath sounds  · no rales  Cardiovascular:  · no jugular venous distention  · regular rhythm  · apical impulse normal  · S1 normal, S2 normal  · no S3, no S4   · no murmur  · no rub, no thrill  · carotid pulses normal; no bruit  · pedal pulses normal  · lower extremity edema: none    Skin:   warm, dry      RESULTS:   Procedures    Results for orders placed during the hospital encounter of 07/17/24    Adult Transthoracic Echo Complete w/ Color, Spectral and Contrast if necessary per protocol    Interpretation Summary    Left ventricular ejection fraction appears to be 61 - 65%.    Estimated right ventricular systolic pressure from tricuspid regurgitation is normal (<35 mmHg).    There is a trivial pericardial effusion.        Labs:  No results found for: " "\"CHOL\", \"TRIG\", \"HDL\", \"LDL\", \"AST\", \"ALT\"  No results found for: \"HGBA1C\"  No components found for: \"CREATINININE\"  No results found for: \"EGFRIFNONA\"    Most recent PCP note, imaging tests, and labs reviewed.    ASSESSMENT:  Problem List Items Addressed This Visit       Coronary artery disease of native artery of native heart with stable angina pectoris - Primary (Chronic)    Overview   3V CABG 11/19/2021 (LIMA-LAD, SVG-OM1, SVG-RPDA in [place].  Previous history of stent to mid LAD in 2004 and 2012.         Hypertension, essential (Chronic)    Mixed hyperlipidemia (Chronic)       PLAN:    1.  CAD:  Continue aspirin, stopped Plavix 9/2023  Continue atorvastatin  Continue Jardiance  Low risk stress test and echo 7/2024    EKG 6/19/2025 shows normal sinus rhythm heart rate 75, lateral T wave inversion which are chronic and unchanged compared to 2023      2.  Primary hypertension:  Stopped metoprolol due to fatigue 9/2023  Continue lisinopril, refill today    3.  Mixed hyperlipidemia:  Refilled atorvastatin 80 mg daily, advised him to take daily.  Refill today  Lipids 5/2024 TC: 174, LDL 98, HDL 36    Repeat lipids pending    4.  Diabetes mellitus type 2:  Much improved on Mounjaro and Jardiance  A1c much improved    5.  BPH  Stable    6.  Right lower leg pain, concern for claudication, proceed with exercise ABIs.    Advance Care Planning   ACP discussion was held with the patient during this visit. Patient has an advance directive (not in EMR), copy requested.         Return to clinic in 12 months, or sooner as needed.    Thank you for the opportunity to share in the care of your patient; please do not hesitate to call me with any questions.     John Sam MD, [facility], [facility]  Office: [phone]  [facility] [place] [address]    06/19/25    "

## 2025-07-22 ENCOUNTER — HOSPITAL ENCOUNTER (OUTPATIENT)
Dept: CARDIOLOGY | Facility: HOSPITAL | Age: 70
Discharge: HOME | End: 2025-07-22
Admitting: INTERNAL MEDICINE
Payer: MEDICARE

## 2025-07-22 DIAGNOSIS — I73.9 RIGHT LEG CLAUDICATION: ICD-10-CM

## 2025-07-22 PROCEDURE — 93924 LWR XTR VASC STDY BILAT: CPT

## 2025-07-27 LAB
BH CV LOWER ARTERIAL LEFT ABI RATIO: 1.23
BH CV LOWER ARTERIAL LEFT CALF RATIO: 1.15
BH CV LOWER ARTERIAL LEFT DORSALIS PEDIS SYS MAX: 154
BH CV LOWER ARTERIAL LEFT GREAT TOE SYS MAX: 113
BH CV LOWER ARTERIAL LEFT LOW THIGH RATIO: 1.15
BH CV LOWER ARTERIAL LEFT LOW THIGH SYS MAX: 158
BH CV LOWER ARTERIAL LEFT POPLITEAL SYS MAX: 157
BH CV LOWER ARTERIAL LEFT POST TIBIAL SYS MAX: 169
BH CV LOWER ARTERIAL LEFT TBI RATIO: 0.82
BH CV LOWER ARTERIAL RIGHT ABI RATIO: 1.27
BH CV LOWER ARTERIAL RIGHT CALF RATIO: 1.1
BH CV LOWER ARTERIAL RIGHT DORSALIS PEDIS SYS MAX: 164
BH CV LOWER ARTERIAL RIGHT GREAT TOE SYS MAX: 91
BH CV LOWER ARTERIAL RIGHT LOW THIGH RATIO: 1.16
BH CV LOWER ARTERIAL RIGHT LOW THIGH SYS MAX: 159
BH CV LOWER ARTERIAL RIGHT POPLITEAL SYS MAX: 151
BH CV LOWER ARTERIAL RIGHT POST TIBIAL SYS MAX: 174
BH CV LOWER ARTERIAL RIGHT TBI RATIO: 0.66
UPPER ARTERIAL LEFT ARM BRACHIAL SYS MAX: 134
UPPER ARTERIAL RIGHT ARM BRACHIAL SYS MAX: 137

## 2025-07-28 ENCOUNTER — RESULTS FOLLOW-UP (OUTPATIENT)
Dept: CARDIOLOGY | Facility: CLINIC | Age: 70
End: 2025-07-28
Payer: MEDICARE

## 2025-07-28 DIAGNOSIS — I73.9 RIGHT LEG CLAUDICATION: Primary | ICD-10-CM

## 2025-07-28 DIAGNOSIS — I10 ESSENTIAL (PRIMARY) HYPERTENSION: ICD-10-CM

## 2025-08-06 ENCOUNTER — HOSPITAL ENCOUNTER (OUTPATIENT)
Facility: HOSPITAL | Age: 70
Discharge: HOME | End: 2025-08-06
Admitting: INTERNAL MEDICINE
Payer: MEDICARE

## 2025-08-06 DIAGNOSIS — I10 ESSENTIAL (PRIMARY) HYPERTENSION: ICD-10-CM

## 2025-08-06 DIAGNOSIS — I73.9 RIGHT LEG CLAUDICATION: ICD-10-CM

## 2025-08-06 LAB — CREAT BLDA-MCNC: 1.1 MG/DL (ref 0.6–1.3)

## 2025-08-06 PROCEDURE — 82565 ASSAY OF CREATININE: CPT

## 2025-08-06 PROCEDURE — 75635 CT ANGIO ABDOMINAL ARTERIES: CPT

## 2025-08-06 PROCEDURE — 25510000001 IOPAMIDOL PER 1 ML: Performed by: INTERNAL MEDICINE

## 2025-08-06 RX ORDER — IOPAMIDOL 755 MG/ML
124 INJECTION, SOLUTION INTRAVASCULAR
Status: COMPLETED | OUTPATIENT
Start: 2025-08-06 | End: 2025-08-06

## 2025-08-06 RX ADMIN — IOPAMIDOL 124 ML: 755 INJECTION, SOLUTION INTRAVENOUS at 08:19
